# Patient Record
Sex: MALE | Race: WHITE | URBAN - METROPOLITAN AREA
[De-identification: names, ages, dates, MRNs, and addresses within clinical notes are randomized per-mention and may not be internally consistent; named-entity substitution may affect disease eponyms.]

---

## 2022-08-31 ENCOUNTER — OFFICE VISIT (OUTPATIENT)
Dept: URBAN - METROPOLITAN AREA CLINIC 24 | Facility: CLINIC | Age: 22
End: 2022-08-31
Payer: COMMERCIAL

## 2022-08-31 DIAGNOSIS — H20.013 PRIMARY IRIDOCYCLITIS, BILATERAL: Primary | ICD-10-CM

## 2022-08-31 DIAGNOSIS — H04.123 TEAR FILM INSUFFICIENCY OF BILATERAL LACRIMAL GLANDS: ICD-10-CM

## 2022-08-31 PROCEDURE — 92004 COMPRE OPH EXAM NEW PT 1/>: CPT | Performed by: OPTOMETRIST

## 2022-08-31 ASSESSMENT — INTRAOCULAR PRESSURE
OD: 14
OS: 12

## 2022-08-31 ASSESSMENT — VISUAL ACUITY
OS: 20/20
OD: 20/20

## 2022-08-31 ASSESSMENT — KERATOMETRY
OD: 41.19
OS: 41.16

## 2022-08-31 NOTE — IMPRESSION/PLAN
Impression: Primary iridocyclitis, bilateral: H20.013. Plan: Per patient: Recurrent bilateral iritis. (+) anklyosing spondylitis. Under the care of rheumatology. Today's examination shows quiet A/C, no evidence of current or previous iritis, however, based on symptoms and autoimmune diagnosis, it is probable. Instructed pt to RTC immediately if any new onset symptoms.